# Patient Record
Sex: FEMALE | Race: ASIAN | NOT HISPANIC OR LATINO | URBAN - METROPOLITAN AREA
[De-identification: names, ages, dates, MRNs, and addresses within clinical notes are randomized per-mention and may not be internally consistent; named-entity substitution may affect disease eponyms.]

---

## 2021-04-26 ENCOUNTER — OFFICE VISIT (OUTPATIENT)
Dept: URBAN - METROPOLITAN AREA CLINIC 33 | Facility: CLINIC | Age: 46
End: 2021-04-26

## 2021-04-26 ENCOUNTER — TELEPHONE ENCOUNTER (OUTPATIENT)
Dept: URBAN - METROPOLITAN AREA CLINIC 35 | Facility: CLINIC | Age: 46
End: 2021-04-26

## 2021-04-26 VITALS
OXYGEN SATURATION: 99 % | HEART RATE: 59 BPM | SYSTOLIC BLOOD PRESSURE: 118 MMHG | WEIGHT: 120 LBS | BODY MASS INDEX: 21.26 KG/M2 | DIASTOLIC BLOOD PRESSURE: 70 MMHG | HEIGHT: 63 IN

## 2021-04-26 RX ORDER — ESOMEPRAZOLE MAGNESIUM 40 MG/1
1 CAPSULE CAPSULE, DELAYED RELEASE ORAL ONCE A DAY
Qty: 30 | Status: ACTIVE | COMMUNITY

## 2021-04-26 RX ORDER — FAMOTIDINE 40 MG/1
1 TABLET AT BEDTIME TABLET, FILM COATED ORAL ONCE A DAY
Qty: 60 | Refills: 1 | OUTPATIENT
Start: 2021-04-26

## 2021-04-26 RX ORDER — SODIUM, POTASSIUM,MAG SULFATES 17.5-3.13G
ML AS DIRECTED SOLUTION, RECONSTITUTED, ORAL ORAL
Qty: 1 KIT | Refills: 0 | OUTPATIENT
Start: 2021-05-14

## 2021-04-26 NOTE — HPI-MIGRATED HPI
;     Heartburn :                   45 year patient presents for heartburn consult. Patient states she has been experiencing symptoms since about a month ago. Patient admits currently taking Nexium prescribed medications for relief of symptoms. Patient describes symptoms as painful, feels like her throat is on fire and states they are most present throughout the day . Patient admits nighttime symptoms. Patient denies nausea or vomiting. Patient admits any associated weight loss but she admits that it is due to stress. Patient is not too sure if she's had and EGD in the past. She admits to having a Barium swallow test done a long time ago. Patifoxt also admits to some abdominal pain daily, nothing is taking for relief of symptoms.   ;

## 2021-05-14 ENCOUNTER — TELEPHONE ENCOUNTER (OUTPATIENT)
Dept: URBAN - METROPOLITAN AREA CLINIC 35 | Facility: CLINIC | Age: 46
End: 2021-05-14

## 2021-05-14 PROBLEM — 428283002 HISTORY OF POLYP OF COLON (SITUATION): Status: ACTIVE | Noted: 2021-05-14

## 2021-05-19 ENCOUNTER — TELEPHONE ENCOUNTER (OUTPATIENT)
Dept: URBAN - METROPOLITAN AREA CLINIC 35 | Facility: CLINIC | Age: 46
End: 2021-05-19

## 2021-05-20 ENCOUNTER — OFFICE VISIT (OUTPATIENT)
Dept: URBAN - METROPOLITAN AREA SURGERY CENTER 8 | Facility: SURGERY CENTER | Age: 46
End: 2021-05-20

## 2021-06-04 ENCOUNTER — TELEPHONE ENCOUNTER (OUTPATIENT)
Dept: URBAN - METROPOLITAN AREA CLINIC 35 | Facility: CLINIC | Age: 46
End: 2021-06-04

## 2021-06-07 ENCOUNTER — OFFICE VISIT (OUTPATIENT)
Dept: URBAN - METROPOLITAN AREA CLINIC 33 | Facility: CLINIC | Age: 46
End: 2021-06-07

## 2021-06-07 LAB
ALMOND (F20) IGE: <0.1
CASHEW NUT (F202) IGE: <0.1
CLASS: 0
CODFISH (F3) IGE: <0.1
EGG WHITE (F1) IGE: <0.1
HAZELNUT (F17) IGE: <0.1
IMMUNOGLOBULIN A: 325
INTERPRETATION: (no result)
INTERPRETATION: (no result)
MILK (F2) IGE: <0.1
PEANUT (F13) IGE: <0.1
SALMON (F41) IGE: <0.1
SCALLOP (F338) IGE: <0.1
SESAME SEED (F10) IGE: <0.1
SHRIMP (F24) IGE: <0.1
SOYBEAN (F14) IGE: <0.1
TISSUE TRANSGLUTAMINASE$AB, IGA: 1
TUNA (F40) IGE: <0.1
WALNUT (F256) IGE: <0.1
WHEAT (F4) IGE: <0.1

## 2021-06-07 NOTE — HPI-MIGRATED HPI
;   ;     Follow up- EGD : Patient presents today for follow up to his/her EGD which was completed on  (05/20/2021) by Dr. Pressley.  Patient admits/denies any complications after his/her procedure. Since the procedure patient denies? dysphagia, heartburn, globus, changes in appetite, and changes in bowel habits.    LABS ARE NOT DONE 6/4/21 - charanjitk - please see the TE for details.    EGD report shows: Erythematous mucosa in the antrum and gastric body. Biopsied.;   Heartburn : Patient continuous admits/denies heartburn. Patient admits??  currently taking Nexium prescribed medications for relief of symptoms.   Patient denies ?? nausea, vomiting or associated abdominal pain.      Last visit (04/26/2021) 45 year patient presents for heartburn consult. Patient states she has been experiencing symptoms since about a month ago. Patient admits currently taking Nexium prescribed medications for relief of symptoms. Patient describes symptoms as painful, feels like her throat is on fire and states they are most present throughout the day . Patient admits nighttime symptoms. Patient denies nausea or vomiting. Patient admits any associated weight loss but she admits that it is due to stress. Patient is not too sure if she's had and EGD in the past. She admits to having a Barium swallow test done a long time ago. Matyt also admits to some abdominal pain daily, nothing is taking for relief of symptoms.;

## 2021-06-09 ENCOUNTER — OFFICE VISIT (OUTPATIENT)
Dept: URBAN - METROPOLITAN AREA CLINIC 33 | Facility: CLINIC | Age: 46
End: 2021-06-09

## 2021-06-21 ENCOUNTER — OFFICE VISIT (OUTPATIENT)
Dept: URBAN - METROPOLITAN AREA CLINIC 33 | Facility: CLINIC | Age: 46
End: 2021-06-21

## 2021-06-21 VITALS
SYSTOLIC BLOOD PRESSURE: 108 MMHG | TEMPERATURE: 97.09 F | BODY MASS INDEX: 21.79 KG/M2 | WEIGHT: 123 LBS | HEIGHT: 63 IN | DIASTOLIC BLOOD PRESSURE: 72 MMHG

## 2021-06-21 PROBLEM — 79922009 EPIGASTRIC PAIN: Status: ACTIVE | Noted: 2021-04-26

## 2021-06-21 PROBLEM — 10743008 IRRITABLE BOWEL SYNDROME: Status: ACTIVE | Noted: 2021-06-21

## 2021-06-21 PROBLEM — 16331000 HEARTBURN: Status: ACTIVE | Noted: 2021-04-26

## 2021-06-21 RX ORDER — ESOMEPRAZOLE MAGNESIUM 40 MG/1
1 CAPSULE CAPSULE, DELAYED RELEASE ORAL ONCE A DAY
Qty: 30 | Status: ACTIVE | COMMUNITY

## 2021-06-21 RX ORDER — SODIUM, POTASSIUM,MAG SULFATES 17.5-3.13G
ML AS DIRECTED SOLUTION, RECONSTITUTED, ORAL ORAL
Qty: 1 KIT | Refills: 0 | Status: DISCONTINUED | COMMUNITY
Start: 2021-05-14

## 2021-06-21 RX ORDER — FAMOTIDINE 40 MG/1
1 TABLET AT BEDTIME TABLET, FILM COATED ORAL ONCE A DAY
Qty: 60 | Refills: 1 | Status: ACTIVE | COMMUNITY
Start: 2021-04-26

## 2021-06-21 RX ORDER — FAMOTIDINE 40 MG/1
1 TABLET TABLET, FILM COATED ORAL TWICE A DAY
Qty: 60 TABLET | Refills: 5 | OUTPATIENT
Start: 2021-06-21

## 2021-06-21 NOTE — HPI-MIGRATED HPI
;     Heartburn : Patient presents today for follow up to her EGD which was completed on  (05/20/2021) by Dr. Pressley.  Patient denies any complications after her procedure. Since the procedure patient denies dysphagia,globus, changes in appetite.    Patient admits  heartburn. Patient admits currently taking famotidine prescribed medications for relief of symptoms.  she admits she is still experieicing breakthrough episodes .   Patient denies  nausea, vomiting or associated abdominal pain.      Last visit (04/26/2021) 45 year patient presents for heartburn consult. Patient states she has been experiencing symptoms since about a month ago. Patient admits currently taking Nexium prescribed medications for relief of symptoms. Patient describes symptoms as painful, feels like her throat is on fire and states they are most present throughout the day . Patient admits nighttime symptoms. Patient denies nausea or vomiting. Patient admits any associated weight loss but she admits that it is due to stress. Patient is not too sure if she's had and EGD in the past. She admits to having a Barium swallow test done a long time ago. Matyt also admits to some abdominal pain daily, nothing is taking for relief of symptoms.;

## 2021-08-02 ENCOUNTER — OFFICE VISIT (OUTPATIENT)
Dept: URBAN - METROPOLITAN AREA CLINIC 33 | Facility: CLINIC | Age: 46
End: 2021-08-02